# Patient Record
(demographics unavailable — no encounter records)

---

## 2017-06-07 NOTE — RAD
FACIAL BONES TWO VIEWS:

6/7/17

 

HISTORY: 

Trauma to left orbit region.

 

Sinuses appear clear. I do not appreciate a definitive fracture. 

 

IMPRESSION:  

No evidence of fracture. If there is high clinical index of suspicion of fracture, CT is recommended
. 

 

POS: [facility]